# Patient Record
Sex: MALE | Race: WHITE | NOT HISPANIC OR LATINO | URBAN - METROPOLITAN AREA
[De-identification: names, ages, dates, MRNs, and addresses within clinical notes are randomized per-mention and may not be internally consistent; named-entity substitution may affect disease eponyms.]

---

## 2019-11-09 ENCOUNTER — EMERGENCY (EMERGENCY)
Facility: HOSPITAL | Age: 69
LOS: 1 days | Discharge: HOME | End: 2019-11-09
Attending: EMERGENCY MEDICINE
Payer: MEDICARE

## 2019-11-09 VITALS
TEMPERATURE: 98 F | HEART RATE: 86 BPM | OXYGEN SATURATION: 99 % | RESPIRATION RATE: 20 BRPM | SYSTOLIC BLOOD PRESSURE: 134 MMHG | DIASTOLIC BLOOD PRESSURE: 79 MMHG

## 2019-11-09 VITALS — WEIGHT: 164.91 LBS | HEIGHT: 69 IN

## 2019-11-09 PROCEDURE — 99283 EMERGENCY DEPT VISIT LOW MDM: CPT

## 2019-11-09 RX ORDER — IBUPROFEN 200 MG
600 TABLET ORAL ONCE
Refills: 0 | Status: COMPLETED | OUTPATIENT
Start: 2019-11-09 | End: 2019-11-09

## 2019-11-09 RX ORDER — AZTREONAM 2 G
1 VIAL (EA) INJECTION
Qty: 14 | Refills: 0
Start: 2019-11-09 | End: 2019-11-15

## 2019-11-09 RX ORDER — CEPHALEXIN 500 MG
1 CAPSULE ORAL
Qty: 28 | Refills: 0
Start: 2019-11-09 | End: 2019-11-15

## 2019-11-09 RX ORDER — CEPHALEXIN 500 MG
500 CAPSULE ORAL ONCE
Refills: 0 | Status: COMPLETED | OUTPATIENT
Start: 2019-11-09 | End: 2019-11-09

## 2019-11-09 RX ADMIN — Medication 600 MILLIGRAM(S): at 11:29

## 2019-11-09 RX ADMIN — Medication 600 MILLIGRAM(S): at 11:40

## 2019-11-09 RX ADMIN — Medication 500 MILLIGRAM(S): at 11:40

## 2019-11-09 RX ADMIN — Medication 1 TABLET(S): at 11:40

## 2019-11-09 NOTE — ED STATDOCS - PATIENT PORTAL LINK FT
You can access the FollowMyHealth Patient Portal offered by [facility] by registering at the following website: [email or web address]. By joining [employer]’s FollowMyHealth portal, you will also be able to view your health information using other applications (apps) compatible with our system.

## 2019-11-09 NOTE — ED ADULT TRIAGE NOTE - CHIEF COMPLAINT QUOTE
sent from urgent care for facial pain, redness and swelling, was told by urgent care either shingles or cellulitis

## 2019-11-09 NOTE — ED STATDOCS - OBJECTIVE STATEMENT
68 y/o M pt with hx of chronic neck pain presents to ED with wife at bedside c/o gradual onset progressive left sided facial swelling ( left cheek/ upper left lip), pain and erythema x 3-4 days. Patient Also c/o diffuse scalp pain and throat pain. No difficulty swallowing or breathing. Patient had dental abscesses in the past; has dentures in place to upper and lower mouth. Tolerating PO. No eye pain, fever, chills, nausea, vomiting, ABD pain diarrhea, dizziness, CP or SOB. Does not have PMD. No further complaints at this time. 70 y/o M pt with hx of chronic neck pain presents to ED with wife at bedside c/o gradual onset progressive left sided facial swelling ( left cheek/ upper left lip), pain and erythema x 3-4 days. Patient Also c/o diffuse scalp pain and intermittnet sore throat. No difficulty swallowing or breathing. Patient had dental abscesses in the past; has dentures in place to upper and lower mouth. Tolerating PO. No eye pain, fever, chills, nausea, vomiting, ABD pain diarrhea, dizziness, CP or SOB. Does not have PMD. No further complaints at this time.

## 2019-11-09 NOTE — ED STATDOCS - ENMT, MLM
Mouth with normal mucosa, no gingival swelling, no pharyngeal erythema, uvula is midline, normal TMs + warmth, erythema to left cheek that is indurated going around lip line, no fluctuance Mouth with normal mucosa, no gingival swelling, no pharyngeal erythema, uvula is midline, normal TMs + warmth, erythema to left cheek that is indurated going around lip line, no fluctuance; no eye involvement

## 2019-11-11 ENCOUNTER — INPATIENT (INPATIENT)
Facility: HOSPITAL | Age: 69
LOS: 2 days | Discharge: HOME | DRG: 596 | End: 2019-11-14
Attending: HOSPITALIST | Admitting: HOSPITALIST
Payer: MEDICARE

## 2019-11-11 VITALS
HEART RATE: 94 BPM | OXYGEN SATURATION: 96 % | TEMPERATURE: 99 F | WEIGHT: 164.91 LBS | SYSTOLIC BLOOD PRESSURE: 122 MMHG | HEIGHT: 69 IN | RESPIRATION RATE: 18 BRPM | DIASTOLIC BLOOD PRESSURE: 77 MMHG

## 2019-11-11 DIAGNOSIS — Z98.890 OTHER SPECIFIED POSTPROCEDURAL STATES: Chronic | ICD-10-CM

## 2019-11-11 LAB
ALBUMIN SERPL ELPH-MCNC: 5.1 G/DL — SIGNIFICANT CHANGE UP (ref 3.3–5.2)
ALP SERPL-CCNC: 80 U/L — SIGNIFICANT CHANGE UP (ref 40–120)
ALT FLD-CCNC: 16 U/L — SIGNIFICANT CHANGE UP
ANION GAP SERPL CALC-SCNC: 16 MMOL/L — SIGNIFICANT CHANGE UP (ref 5–17)
APTT BLD: 30.4 SEC — SIGNIFICANT CHANGE UP (ref 27.5–36.3)
AST SERPL-CCNC: 21 U/L — SIGNIFICANT CHANGE UP
BASOPHILS # BLD AUTO: 0.06 K/UL — SIGNIFICANT CHANGE UP (ref 0–0.2)
BASOPHILS NFR BLD AUTO: 1.1 % — SIGNIFICANT CHANGE UP (ref 0–2)
BILIRUB SERPL-MCNC: 0.4 MG/DL — SIGNIFICANT CHANGE UP (ref 0.4–2)
BUN SERPL-MCNC: 10 MG/DL — SIGNIFICANT CHANGE UP (ref 8–20)
CALCIUM SERPL-MCNC: 9.5 MG/DL — SIGNIFICANT CHANGE UP (ref 8.6–10.2)
CHLORIDE SERPL-SCNC: 96 MMOL/L — LOW (ref 98–107)
CO2 SERPL-SCNC: 22 MMOL/L — SIGNIFICANT CHANGE UP (ref 22–29)
CREAT SERPL-MCNC: 0.99 MG/DL — SIGNIFICANT CHANGE UP (ref 0.5–1.3)
EOSINOPHIL # BLD AUTO: 0.11 K/UL — SIGNIFICANT CHANGE UP (ref 0–0.5)
EOSINOPHIL NFR BLD AUTO: 2 % — SIGNIFICANT CHANGE UP (ref 0–6)
ERYTHROCYTE [SEDIMENTATION RATE] IN BLOOD: 2 MM/HR — SIGNIFICANT CHANGE UP (ref 0–20)
GLUCOSE SERPL-MCNC: 91 MG/DL — SIGNIFICANT CHANGE UP (ref 70–115)
HBA1C BLD-MCNC: 5.4 % — SIGNIFICANT CHANGE UP (ref 4–5.6)
HCT VFR BLD CALC: 50.5 % — HIGH (ref 39–50)
HGB BLD-MCNC: 17.7 G/DL — HIGH (ref 13–17)
IMM GRANULOCYTES NFR BLD AUTO: 0.2 % — SIGNIFICANT CHANGE UP (ref 0–1.5)
INR BLD: 0.93 RATIO — SIGNIFICANT CHANGE UP (ref 0.88–1.16)
LYMPHOCYTES # BLD AUTO: 0.67 K/UL — LOW (ref 1–3.3)
LYMPHOCYTES # BLD AUTO: 12 % — LOW (ref 13–44)
MCHC RBC-ENTMCNC: 33.7 PG — SIGNIFICANT CHANGE UP (ref 27–34)
MCHC RBC-ENTMCNC: 35 GM/DL — SIGNIFICANT CHANGE UP (ref 32–36)
MCV RBC AUTO: 96.2 FL — SIGNIFICANT CHANGE UP (ref 80–100)
MONOCYTES # BLD AUTO: 0.81 K/UL — SIGNIFICANT CHANGE UP (ref 0–0.9)
MONOCYTES NFR BLD AUTO: 14.5 % — HIGH (ref 2–14)
NEUTROPHILS # BLD AUTO: 3.94 K/UL — SIGNIFICANT CHANGE UP (ref 1.8–7.4)
NEUTROPHILS NFR BLD AUTO: 70.2 % — SIGNIFICANT CHANGE UP (ref 43–77)
PLATELET # BLD AUTO: 166 K/UL — SIGNIFICANT CHANGE UP (ref 150–400)
POTASSIUM SERPL-MCNC: 4.5 MMOL/L — SIGNIFICANT CHANGE UP (ref 3.5–5.3)
POTASSIUM SERPL-SCNC: 4.5 MMOL/L — SIGNIFICANT CHANGE UP (ref 3.5–5.3)
PROT SERPL-MCNC: 7.9 G/DL — SIGNIFICANT CHANGE UP (ref 6.6–8.7)
PROTHROM AB SERPL-ACNC: 10.7 SEC — SIGNIFICANT CHANGE UP (ref 10–12.9)
RBC # BLD: 5.25 M/UL — SIGNIFICANT CHANGE UP (ref 4.2–5.8)
RBC # FLD: 12.8 % — SIGNIFICANT CHANGE UP (ref 10.3–14.5)
SODIUM SERPL-SCNC: 134 MMOL/L — LOW (ref 135–145)
WBC # BLD: 5.6 K/UL — SIGNIFICANT CHANGE UP (ref 3.8–10.5)
WBC # FLD AUTO: 5.6 K/UL — SIGNIFICANT CHANGE UP (ref 3.8–10.5)

## 2019-11-11 PROCEDURE — 70487 CT MAXILLOFACIAL W/DYE: CPT | Mod: 26

## 2019-11-11 PROCEDURE — 99218: CPT

## 2019-11-11 RX ORDER — LANOLIN ALCOHOL/MO/W.PET/CERES
5 CREAM (GRAM) TOPICAL AT BEDTIME
Refills: 0 | Status: DISCONTINUED | OUTPATIENT
Start: 2019-11-11 | End: 2019-11-14

## 2019-11-11 RX ORDER — KETOROLAC TROMETHAMINE 30 MG/ML
15 SYRINGE (ML) INJECTION EVERY 8 HOURS
Refills: 0 | Status: DISCONTINUED | OUTPATIENT
Start: 2019-11-11 | End: 2019-11-14

## 2019-11-11 RX ORDER — PIPERACILLIN AND TAZOBACTAM 4; .5 G/20ML; G/20ML
3.38 INJECTION, POWDER, LYOPHILIZED, FOR SOLUTION INTRAVENOUS EVERY 8 HOURS
Refills: 0 | Status: DISCONTINUED | OUTPATIENT
Start: 2019-11-11 | End: 2019-11-11

## 2019-11-11 RX ORDER — ACETAMINOPHEN 500 MG
975 TABLET ORAL EVERY 6 HOURS
Refills: 0 | Status: DISCONTINUED | OUTPATIENT
Start: 2019-11-11 | End: 2019-11-14

## 2019-11-11 RX ORDER — PIPERACILLIN AND TAZOBACTAM 4; .5 G/20ML; G/20ML
3.38 INJECTION, POWDER, LYOPHILIZED, FOR SOLUTION INTRAVENOUS EVERY 6 HOURS
Refills: 0 | Status: DISCONTINUED | OUTPATIENT
Start: 2019-11-11 | End: 2019-11-11

## 2019-11-11 RX ORDER — DIAZEPAM 5 MG
2 TABLET ORAL ONCE
Refills: 0 | Status: DISCONTINUED | OUTPATIENT
Start: 2019-11-11 | End: 2019-11-11

## 2019-11-11 RX ORDER — PIPERACILLIN AND TAZOBACTAM 4; .5 G/20ML; G/20ML
3.38 INJECTION, POWDER, LYOPHILIZED, FOR SOLUTION INTRAVENOUS ONCE
Refills: 0 | Status: COMPLETED | OUTPATIENT
Start: 2019-11-11 | End: 2019-11-11

## 2019-11-11 RX ADMIN — Medication 600 MILLIGRAM(S): at 16:35

## 2019-11-11 RX ADMIN — PIPERACILLIN AND TAZOBACTAM 3.38 GRAM(S): 4; .5 INJECTION, POWDER, LYOPHILIZED, FOR SOLUTION INTRAVENOUS at 14:24

## 2019-11-11 RX ADMIN — PIPERACILLIN AND TAZOBACTAM 200 GRAM(S): 4; .5 INJECTION, POWDER, LYOPHILIZED, FOR SOLUTION INTRAVENOUS at 10:43

## 2019-11-11 RX ADMIN — Medication 100 MILLIGRAM(S): at 15:26

## 2019-11-11 RX ADMIN — Medication 2 MILLIGRAM(S): at 23:01

## 2019-11-11 RX ADMIN — Medication 15 MILLIGRAM(S): at 16:35

## 2019-11-11 RX ADMIN — Medication 600 MILLIGRAM(S): at 23:32

## 2019-11-11 RX ADMIN — Medication 15 MILLIGRAM(S): at 17:38

## 2019-11-11 RX ADMIN — Medication 100 MILLIGRAM(S): at 23:01

## 2019-11-11 NOTE — ED ADULT NURSE REASSESSMENT NOTE - NEURO WDL
LM letting mom know to call back to sched RN visit for 4 mo shots. Alert and oriented to person, place and time, memory intact, behavior appropriate to situation, PERRL.

## 2019-11-11 NOTE — ED STATDOCS - SKIN, MLM
STS and well demarcated erythematous patch to left face from forehead to cheek with left infraorbital STS, small abrasion overlying left zygoma.

## 2019-11-11 NOTE — ED CDU PROVIDER INITIAL DAY NOTE - CARDIAC, MLM
Normal rate, regular rhythm.  Heart sounds S1, S2. Normal rate, regular rhythm.  Heart sounds S1, S2. Peripheral pulse 2+ b/l

## 2019-11-11 NOTE — ED CDU PROVIDER INITIAL DAY NOTE - OBJECTIVE STATEMENT
70y/o M presents to the ED c/o worsening left sided facial swelling, localized under eye with sx of intermittent shooting pain localized behind left ear and fever, onset 1 week ago. Pt reports that he presented to the ED 2 days ago, was prescribed medication which he has been compliant with taking and was referred to follow up at ED if sx worsened. Denies congestion, SOB, cough. No additional complaints at this time.   Denies congestion.  no recent dental or ent work   no trauma to the face

## 2019-11-11 NOTE — ED CDU PROVIDER INITIAL DAY NOTE - PROGRESS NOTE DETAILS
Pt evaluated at bedside. Reporting left facial cellulitis, recently d/c'd on Keflex/bactrim but back due to getting worse. No PMHx but has not seen PMD in long time. HPI/PE/ROS as noted above. Will continue IV clindamycin. PT signed out to SHANNON hastings after lengthy discussion about case with SHANNON FERRELL.   Pt seen resting comfortable in no acute distress in bed, no acute complaints will follow CDU initial intake orders observe for change in pt condition, results and or consults.

## 2019-11-11 NOTE — ED CDU PROVIDER INITIAL DAY NOTE - PHYSICAL EXAMINATION
face : left redness from inferior orbit extending inferiorly to the left  lip. warm to touch and blanchable Face : left redness from inferior orbit extending inferiorly to the left  lip. warm to touch and blanchable

## 2019-11-11 NOTE — ED ADULT TRIAGE NOTE - CHIEF COMPLAINT QUOTE
Was in ED saturday, given medication for infection to left side of face, worsening redness and swelling

## 2019-11-11 NOTE — ED STATDOCS - OBJECTIVE STATEMENT
68y/o M presents to the ED c/o worsening left sided facial swelling, localized under eye with sx of intermittent shooting pain localized behind left ear and fever, onset 1 week ago. Pt reports that he presented to the ED 2 days ago, was prescribed medication which he has been compliant with taking and was referred to follow up at ED if sx worsened. Denies congestion, SOB, cough. No additional complaints at this time.   Denies congestion. 70y/o M presents to the ED c/o worsening left sided facial swelling and soreness with sx of intermittent shooting pain localized behind left ear and fever, onset 1 week ago. Pt reports that he presented to the ED 2 days ago, was prescribed medication which he has been compliant with taking and was referred to follow up at ED if sx worsened. Denies congestion, SOB, cough. Denies dental pain or sinus problemsNo additional complaints at this time.   Denies congestion.

## 2019-11-11 NOTE — ED CDU PROVIDER INITIAL DAY NOTE - ATTENDING CONTRIBUTION TO CARE
I agree with the PA's note and was available for any issues/concerns. I was directly involved in patient care. My brief overall assessment is as follows: pt with left facial celluitis, no deeper infection/abscess on ct. recently on keflex/bactrim. will give iv clinda. otherwise well and no airway compromise/complaints.

## 2019-11-11 NOTE — ED STATDOCS - NS_ ATTENDINGSCRIBEDETAILS _ED_A_ED_FT
I, Kurtis Mina, performed the initial face to face bedside interview with this patient regarding history of present illness, review of symptoms and relevant past medical, social and family history.  I completed an independent physical examination.  I was the provider who initially evaluated this patient.  The history, relevant review of systems, past medical and surgical history, medical decision making, and physical examination was documented by the scribe in my presence and I attest to the accuracy of the documentation. Follow-up on ordered tests (ie labs, radiologic studies) and re-evaluation of the patient's status has been communicated to the ACP.  Disposition of the patient will be based on test outcome and response to ED interventions.

## 2019-11-11 NOTE — ED STATDOCS - CONSTITUTIONAL, MLM
normal... well appearing, well nourished, and in no apparent distress. nontoxic appearing and in no apparent distress.

## 2019-11-11 NOTE — ED STATDOCS - PROGRESS NOTE DETAILS
SHANNON RAMIREZ: PT evaluated by intake physician. HPI/PE/ROS as noted above. Will follow up plan per intake physician  well demarcated red rash to the left face with failed outpt antibiotics  denies trauma to face fever or chills   pending labs ct

## 2019-11-12 DIAGNOSIS — L03.211 CELLULITIS OF FACE: ICD-10-CM

## 2019-11-12 DIAGNOSIS — F17.200 NICOTINE DEPENDENCE, UNSPECIFIED, UNCOMPLICATED: ICD-10-CM

## 2019-11-12 LAB
APPEARANCE UR: CLEAR — SIGNIFICANT CHANGE UP
BACTERIA # UR AUTO: NEGATIVE — SIGNIFICANT CHANGE UP
BILIRUB UR-MCNC: NEGATIVE — SIGNIFICANT CHANGE UP
COLOR SPEC: YELLOW — SIGNIFICANT CHANGE UP
CULTURE RESULTS: NO GROWTH — SIGNIFICANT CHANGE UP
DIFF PNL FLD: ABNORMAL
EPI CELLS # UR: NEGATIVE — SIGNIFICANT CHANGE UP
GLUCOSE UR QL: NEGATIVE MG/DL — SIGNIFICANT CHANGE UP
HCT VFR BLD CALC: 50.3 % — HIGH (ref 39–50)
HGB BLD-MCNC: 17 G/DL — SIGNIFICANT CHANGE UP (ref 13–17)
KETONES UR-MCNC: NEGATIVE — SIGNIFICANT CHANGE UP
LACTATE BLDV-MCNC: 1.1 MMOL/L — SIGNIFICANT CHANGE UP (ref 0.5–2)
LEUKOCYTE ESTERASE UR-ACNC: NEGATIVE — SIGNIFICANT CHANGE UP
MCHC RBC-ENTMCNC: 33.1 PG — SIGNIFICANT CHANGE UP (ref 27–34)
MCHC RBC-ENTMCNC: 33.8 GM/DL — SIGNIFICANT CHANGE UP (ref 32–36)
MCV RBC AUTO: 97.9 FL — SIGNIFICANT CHANGE UP (ref 80–100)
NITRITE UR-MCNC: NEGATIVE — SIGNIFICANT CHANGE UP
PH UR: 6 — SIGNIFICANT CHANGE UP (ref 5–8)
PLATELET # BLD AUTO: 149 K/UL — LOW (ref 150–400)
PROT UR-MCNC: NEGATIVE MG/DL — SIGNIFICANT CHANGE UP
RBC # BLD: 5.14 M/UL — SIGNIFICANT CHANGE UP (ref 4.2–5.8)
RBC # FLD: 12.7 % — SIGNIFICANT CHANGE UP (ref 10.3–14.5)
RBC CASTS # UR COMP ASSIST: SIGNIFICANT CHANGE UP /HPF (ref 0–4)
SP GR SPEC: 1.01 — SIGNIFICANT CHANGE UP (ref 1.01–1.02)
SPECIMEN SOURCE: SIGNIFICANT CHANGE UP
UROBILINOGEN FLD QL: NEGATIVE MG/DL — SIGNIFICANT CHANGE UP
WBC # BLD: 4.75 K/UL — SIGNIFICANT CHANGE UP (ref 3.8–10.5)
WBC # FLD AUTO: 4.75 K/UL — SIGNIFICANT CHANGE UP (ref 3.8–10.5)
WBC UR QL: SIGNIFICANT CHANGE UP

## 2019-11-12 PROCEDURE — 99222 1ST HOSP IP/OBS MODERATE 55: CPT

## 2019-11-12 PROCEDURE — 99217: CPT

## 2019-11-12 RX ORDER — FLUCONAZOLE 150 MG/1
150 TABLET ORAL ONCE
Refills: 0 | Status: DISCONTINUED | OUTPATIENT
Start: 2019-11-12 | End: 2019-11-12

## 2019-11-12 RX ORDER — VALACYCLOVIR 500 MG/1
1000 TABLET, FILM COATED ORAL EVERY 8 HOURS
Refills: 0 | Status: DISCONTINUED | OUTPATIENT
Start: 2019-11-12 | End: 2019-11-14

## 2019-11-12 RX ORDER — ENOXAPARIN SODIUM 100 MG/ML
40 INJECTION SUBCUTANEOUS DAILY
Refills: 0 | Status: DISCONTINUED | OUTPATIENT
Start: 2019-11-12 | End: 2019-11-14

## 2019-11-12 RX ORDER — DIPHENHYDRAMINE HCL 50 MG
25 CAPSULE ORAL EVERY 4 HOURS
Refills: 0 | Status: DISCONTINUED | OUTPATIENT
Start: 2019-11-12 | End: 2019-11-14

## 2019-11-12 RX ORDER — INFLUENZA VIRUS VACCINE 15; 15; 15; 15 UG/.5ML; UG/.5ML; UG/.5ML; UG/.5ML
0.5 SUSPENSION INTRAMUSCULAR ONCE
Refills: 0 | Status: DISCONTINUED | OUTPATIENT
Start: 2019-11-12 | End: 2019-11-14

## 2019-11-12 RX ADMIN — Medication 5 MILLIGRAM(S): at 21:40

## 2019-11-12 RX ADMIN — Medication 15 MILLIGRAM(S): at 10:24

## 2019-11-12 RX ADMIN — Medication 100 MILLIGRAM(S): at 06:58

## 2019-11-12 RX ADMIN — Medication 125 MILLIGRAM(S): at 10:23

## 2019-11-12 RX ADMIN — Medication 600 MILLIGRAM(S): at 08:24

## 2019-11-12 RX ADMIN — VALACYCLOVIR 1000 MILLIGRAM(S): 500 TABLET, FILM COATED ORAL at 12:35

## 2019-11-12 RX ADMIN — Medication 975 MILLIGRAM(S): at 06:48

## 2019-11-12 RX ADMIN — VALACYCLOVIR 1000 MILLIGRAM(S): 500 TABLET, FILM COATED ORAL at 21:40

## 2019-11-12 RX ADMIN — ENOXAPARIN SODIUM 40 MILLIGRAM(S): 100 INJECTION SUBCUTANEOUS at 12:35

## 2019-11-12 RX ADMIN — Medication 15 MILLIGRAM(S): at 19:56

## 2019-11-12 RX ADMIN — Medication 975 MILLIGRAM(S): at 05:51

## 2019-11-12 RX ADMIN — Medication 25 MILLIGRAM(S): at 10:24

## 2019-11-12 NOTE — ED CDU PROVIDER SUBSEQUENT DAY NOTE - PHYSICAL EXAMINATION
Face : left redness from inferior orbit extending inferiorly to the left  lip. warm to touch and blanchable

## 2019-11-12 NOTE — H&P ADULT - PROBLEM SELECTOR PLAN 1
WBC is normal with no fever. possible Shingles as the erythema takes only the left side of the face with sharp end of the erythema and swelling by the midline of the lower lips. tiny blisters also started to appear although no palpable or tender lymph nodes. there is a new black spot/skin gangrene started to show under the left eye ?? spider bite??.  patient was started on IV Clindamycin 600 mg ever 8 hrs, Valcyclovir 1000 mg po every 8 hrs. ID consult: Dr Melvin, is pending

## 2019-11-12 NOTE — H&P ADULT - ASSESSMENT
68 y/o male smoker with inflammation of the left side of the face with possible cellulitis/Shingles/insect bite

## 2019-11-12 NOTE — ED CDU PROVIDER SUBSEQUENT DAY NOTE - ATTENDING CONTRIBUTION TO CARE
Lorena BRIONES- 68 Y/O M with left periorbital cellulitis which is progressive and becoming more swollen and spreading placed in cdu for iv antibiotics. Swelling overnight has not gone down and rather increasing. No changes in vision but has shooting pain radiating behind left ear to face. 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 The infection appears odontogenic in origin and ct max face showed no abscess that can be drained    pt is alert, well appearing male, s1s2 normal reg, b/l clear breath sounds, abd soft, nt, nd, neuro exam aox3, cn 2-12 intact, no focal deficits, peerl eomi, left facial periorbital swelling and redness, redness extends down to first canine on left and now streaking extends to forehead, On top of redness, noted small vesicles and also noted some satellite lesions    plan to add valtrex, fluconazole and benadryl, solumedrol along with clinda to resolved swelling and redness and admit

## 2019-11-12 NOTE — ED CDU PROVIDER DISPOSITION NOTE - CLINICAL COURSE
70y/o M presents to the ED c/o worsening left sided facial swelling, localized under eye with sx of intermittent shooting pain localized behind left ear and fever, onset 1 week ago. Pt reports that he presented to the ED 3 days ago, was prescribed keflex and bactrim which he has been compliant with taking and was referred to follow up at ED if sx worsened. Denies trauma to the face.  Has been on IV clindamycin x 3 doses.  Wife and patient report no improvement in redness or swelling of left facial cellulitis.  CT without abscess.  Dr Carrillo felt that patient needed valtrex added along with diflucan, solumedrol and benadryl.  Report and care to Dr New, patient admitted for continued abx.

## 2019-11-12 NOTE — H&P ADULT - NEGATIVE OPHTHALMOLOGIC SYMPTOMS
no irritation L/no blurred vision R/no pain L/no pain R/no irritation R/no diplopia/no photophobia/no discharge R/no discharge L/no blurred vision L

## 2019-11-12 NOTE — H&P ADULT - ENMT COMMENTS
left side dusky red swollen skin from the left of the left eye brow to the lower edge of the upper lip with sharp ending of the erythema and swelling at the midline of the upper lip few sessile ting blisters appearing in the left maxillary area. NO palpable left pre auricular, post auricular, submandibular, occipital, or anterior cervical lymph nodes-

## 2019-11-12 NOTE — ED CDU PROVIDER SUBSEQUENT DAY NOTE - HISTORY
PT with cellulitis of the Face with neg CT for collection no visible drainable collection able to be located on PE pt improving swelling redness and pain awaiting further doses of IV abx and revaluation.

## 2019-11-12 NOTE — CONSULT NOTE ADULT - SUBJECTIVE AND OBJECTIVE BOX
[facility]  INFECTIOUS DISEASES AND INTERNAL MEDICINE at [place]  =======================================================  Zain Reina MD  Diplomates American Board of Internal Medicine and Infectious Diseases  =======================================================    [id number]  JANELL KOWALSKI     CC:  left face swelling     HPI:  70 y/o man with no sig significant PMH, noticed swelling in left lower eyelid that started spreading on  came to [facility] ED on  and received bactrim and keflex with no changes, so decided to come in today again. He had scalp sensitivity and shooting pain behind his left ear that still continues. Vision is ok but has erythema in L eye. Had varicella as a kid, never had zoster in the past. Didn't have zoster vaccine in the past. Today had a low grade fever and swelling and pain in left side of face. There are multiple small areas that look like vesicles.     PAST MEDICAL & SURGICAL HISTORY:  No pertinent past medical history  H/O rotator cuff surgery    Social Hx: smoker, no ETOH or drugs     FAMILY HISTORY:  No pertinent family history in first degree relatives    Allergies  No Known Allergies    Antibiotics:  clindamycin IVPB 600 milliGRAM(s) IV Intermittent every 8 hours  valACYclovir 1000 milliGRAM(s) Oral every 8 hours    REVIEW OF SYSTEMS:  CONSTITUTIONAL:  No Fever or chills  HEENT:   No sore throat or runny nose. pain and swelling in left face  CARDIOVASCULAR:  No chest pain or SOB.  RESPIRATORY:  No cough, shortness of breath, PND or orthopnea.  GASTROINTESTINAL:  No nausea, vomiting or diarrhea.  GENITOURINARY:  No dysuria, frequency or urgency. No Blood in urine  MUSCULOSKELETAL:  no joint aches, no muscle pain  SKIN:  left face swelling and pain  NEUROLOGIC:  No paresthesias, fasciculations, seizures or weakness.  PSYCHIATRIC:  No disorder of thought or mood.  ENDOCRINE:  No heat or cold intolerance, polyuria or polydipsia.  HEMATOLOGICAL:  No easy bruising or bleeding.     Physical Exam:  Vital Signs Last 24 Hrs  T(C): 37 (2019 07:56), Max: 37.2 (2019 14:56)  T(F): 98.6 (2019 07:56), Max: 99 (2019 14:56)  HR: 81 (2019 11:06) (71 - 86)  BP: 106/68 (2019 11:06) (106/68 - 135/87  RR: 18 (2019 07:56) (18 - 20)  SpO2: 98% (2019 11:06) (95% - 98%)  GEN: NAD  HEENT: normocephalic and atraumatic. left eye injection, left upper face with erythema and swelling tender in touch especially upper lip.   NECK: Supple.  No lymphadenopathy   LUNGS: Clear to auscultation.  HEART: Regular rate and rhythm without murmur.  ABDOMEN: Soft, nontender, and nondistended.  Positive bowel sounds.    : No CVA tenderness  EXTREMITIES: Without any cyanosis, clubbing, rash, lesions or edema.  NEUROLOGIC: grossly intact.  PSYCHIATRIC: Appropriate affect .  SKIN: small areas of open vesicles in left side of upper face.     Labs:      134<L>  |  96<L>  |  10.0  ----------------------------<  91  4.5   |  22.0  |  0.99    Ca    9.5      2019 10:51    TPro  7.9  /  Alb  5.1  /  TBili  0.4  /  DBili  x   /  AST  21  /  ALT  16  /  AlkPhos  80                          17.0   4.75  )-----------( 149      ( 2019 06:06 )             50.3     PT/INR - ( 2019 10:51 )   PT: 10.7 sec;   INR: 0.93 ratio    PTT - ( 2019 10:51 )  PTT:30.4 sec  Urinalysis Basic - ( 2019 10:27 )    Color: Yellow / Appearance: Clear / S.015 / pH: x  Gluc: x / Ketone: Negative  / Bili: Negative / Urobili: Negative mg/dL   Blood: x / Protein: Negative mg/dL / Nitrite: Negative   Leuk Esterase: Negative / RBC: 0-2 /HPF / WBC 0-2   Sq Epi: x / Non Sq Epi: Negative / Bacteria: Negative    LIVER FUNCTIONS - ( 2019 10:51 )  Alb: 5.1 g/dL / Pro: 7.9 g/dL / ALK PHOS: 80 U/L / ALT: 16 U/L / AST: 21 U/L / GGT: x           RECENT CULTURES:  None    All imaging and other data have been reviewed.      Assessment and Plan:   70 y/o man with no sig significant PMH, noticed swelling in left lower eyelid that started spreading on  came to [facility] ED on  and received bactrim and keflex with no changes, so decided to come in today again. Clinically especially the distribution of rash looks like zoster.     Left ophthalmic zoster     - Blood culture done pending  - Will try to send lesion viral PCR even though no vesicle currently (sent from left nasolabial fold open leison)  - Agree with Antiviral, can continue valtrex 1g daily  - If worsening of his condition with vision change, will switch to IV acyclovir (10mg/kg TID)  - Vision check since can get episcleritis and keratitis, if any sign will call ophthalmology   - Topical streoid eye drop  - Stop clindamycin   - Trend WBC and Tm, currently both normal.     Will follow.    Case discussed with Virgen. [facility]  INFECTIOUS DISEASES AND INTERNAL MEDICINE at [place]  =======================================================  Zain Reina MD  Diplomates American Board of Internal Medicine and Infectious Diseases  =======================================================    [id number]  JANELL KOWALSKI     CC:  left face swelling     HPI:  68 y/o man with no sig significant PMH, noticed swelling in left lower eyelid that started spreading on  came to [facility] ED on  and received bactrim and keflex with no changes, so decided to come in today again. He had scalp sensitivity and shooting pain behind his left ear that still continues. Vision is ok but has erythema in L eye. Had varicella as a kid, never had zoster in the past. Didn't have zoster vaccine in the past. Today had a low grade fever and swelling and pain in left side of face. There are multiple small areas that look like vesicles.     PAST MEDICAL & SURGICAL HISTORY:  No pertinent past medical history  H/O rotator cuff surgery    Social Hx: smoker, no ETOH or drugs     FAMILY HISTORY:  No pertinent family history in first degree relatives    Allergies  No Known Allergies    Antibiotics:  clindamycin IVPB 600 milliGRAM(s) IV Intermittent every 8 hours  valACYclovir 1000 milliGRAM(s) Oral every 8 hours    REVIEW OF SYSTEMS:  CONSTITUTIONAL:  No Fever or chills  HEENT:   No sore throat or runny nose. pain and swelling in left face  CARDIOVASCULAR:  No chest pain or SOB.  RESPIRATORY:  No cough, shortness of breath, PND or orthopnea.  GASTROINTESTINAL:  No nausea, vomiting or diarrhea.  GENITOURINARY:  No dysuria, frequency or urgency. No Blood in urine  MUSCULOSKELETAL:  no joint aches, no muscle pain  SKIN:  left face swelling and pain  NEUROLOGIC:  No paresthesias, fasciculations, seizures or weakness.  PSYCHIATRIC:  No disorder of thought or mood.  ENDOCRINE:  No heat or cold intolerance, polyuria or polydipsia.  HEMATOLOGICAL:  No easy bruising or bleeding.     Physical Exam:  Vital Signs Last 24 Hrs  T(C): 37 (2019 07:56), Max: 37.2 (2019 14:56)  T(F): 98.6 (2019 07:56), Max: 99 (2019 14:56)  HR: 81 (2019 11:06) (71 - 86)  BP: 106/68 (2019 11:06) (106/68 - 135/87  RR: 18 (2019 07:56) (18 - 20)  SpO2: 98% (2019 11:06) (95% - 98%)  GEN: NAD  HEENT: normocephalic and atraumatic. left eye injection, left upper face with erythema and swelling tender in touch especially upper lip.   NECK: Supple.  No lymphadenopathy   LUNGS: Clear to auscultation.  HEART: Regular rate and rhythm without murmur.  ABDOMEN: Soft, nontender, and nondistended.  Positive bowel sounds.    : No CVA tenderness  EXTREMITIES: Without any cyanosis, clubbing, rash, lesions or edema.  NEUROLOGIC: grossly intact.  PSYCHIATRIC: Appropriate affect .  SKIN: small areas of open vesicles in left side of upper face.     Labs:      134<L>  |  96<L>  |  10.0  ----------------------------<  91  4.5   |  22.0  |  0.99    Ca    9.5      2019 10:51    TPro  7.9  /  Alb  5.1  /  TBili  0.4  /  DBili  x   /  AST  21  /  ALT  16  /  AlkPhos  80                          17.0   4.75  )-----------( 149      ( 2019 06:06 )             50.3     PT/INR - ( 2019 10:51 )   PT: 10.7 sec;   INR: 0.93 ratio    PTT - ( 2019 10:51 )  PTT:30.4 sec  Urinalysis Basic - ( 2019 10:27 )    Color: Yellow / Appearance: Clear / S.015 / pH: x  Gluc: x / Ketone: Negative  / Bili: Negative / Urobili: Negative mg/dL   Blood: x / Protein: Negative mg/dL / Nitrite: Negative   Leuk Esterase: Negative / RBC: 0-2 /HPF / WBC 0-2   Sq Epi: x / Non Sq Epi: Negative / Bacteria: Negative    LIVER FUNCTIONS - ( 2019 10:51 )  Alb: 5.1 g/dL / Pro: 7.9 g/dL / ALK PHOS: 80 U/L / ALT: 16 U/L / AST: 21 U/L / GGT: x           RECENT CULTURES:  None    All imaging and other data have been reviewed.      Assessment and Plan:   68 y/o man with no sig significant PMH, noticed swelling in left lower eyelid that started spreading on  came to [facility] ED on  and received bactrim and keflex with no changes, so decided to come in today again. Clinically especially the distribution of rash looks like zoster.     Left maxillary (trigeminal) zoster     - Blood culture done pending  - Will try to send lesion viral PCR even though no vesicle currently (sent from left nasolabial fold open leison)  - Agree with Antiviral, can continue valtrex 1g q8h  - If worsening of his condition will switch to IV acyclovir (10mg/kg TID)  - Vision check if any changes, will call ophthalmology   - Stop clindamycin   - Trend WBC and Tm, currently both normal.     Will follow.    Case discussed with Virgen.

## 2019-11-12 NOTE — ED ADULT NURSE REASSESSMENT NOTE - NS ED NURSE REASSESS COMMENT FT1
Assumed care of pt at this time from AM HR RN. Pt aware he is to be admitted. AM Rn attempted to call for report, RN upstairs refused. Will attempt again. Pt states pain to left face. A/Ox4. Ambulatory and independently will  be admitted for ABX and anti-virals. Call bell within reach, pt watching tv at this time.
Care endorsed to Ruma bateman RN. Patient pending IV abx tonight. No s/s of distress. RN with no further questions.
Pt alert and oriented, no apparent distress noted at this time. Pt handed off to Chinedu MARINO in stable condition.
Report given to GRISELDA RNKassandra 15 2000. Logistics aware, setting up transport at this time. Pt aware of plan of care.
assumed care of pt @ 1930, report received from Marguerite MARINO, charting as noted. pt AOx4 in NAD, Vital Signs Stable. pt presents with redness and swelling to left side of face. pt states his left eye is "tearing up a lot". pt denies any pain at this time. small dry scab noted under left eye, no drainage noted at this time. HR is WNL, lung sounds are clear b/l, abd is soft and nontender with positive bowel sounds in all four quadrants. pt educated on plan of care and observation stay. Plan of care taught back to RN. Proficiency determined from successful pt teach back. Pt oriented to unit, staff, and room. Pt reeducated on call bell use. Bed locked in lowest position, call bell within reach. All questions and concerns addressed.     next dose of IV abx @ 2300. pt agreeable with plan of care.
pt c/o 4/10 pain at this time from left side of face. pt presents with slight increase of redness and swelling to left side of face. SHANNON Uribe reassessed pt. tylenol administered, next dose of IV abx @ 0700.
second dose clindamycin administered. valium administered upon pt request. all questions and concerns addressed. safety precautions in place: stretcher locked in lowest position, call bell in reach, side rail up x1.
Assumed care of the patient at 1445. Patient transferred to observation unit CDU 9. Patient A&Ox4, wife present at the bedside. Left side of face and left lower eyelid noted with swelling and erythema, patient states pain 4/10 to left side of face. SHANNON Cartwright notified, awaiting orders for toradol IV. VSS, afebrile. Patient admitted to observation unit for IV abx. Patient in understanding of plan of care. Patient with no further questions for the nurse. will continue to monitor.

## 2019-11-12 NOTE — ED CDU PROVIDER DISPOSITION NOTE - ATTENDING CONTRIBUTION TO CARE
Lorena BRIONES- 70 Y/O M with left periorbital cellulitis which is progressive and becoming more swollen and spreading placed in cdu for iv antibiotics. Swelling overnight has not gone down and rather increasing. No changes in vision but has shooting pain radiating behind left ear to face. 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 The infection appears odontogenic in origin and ct max face showed no abscess that can be drained    pt is alert, well appearing male, s1s2 normal reg, b/l clear breath sounds, abd soft, nt, nd, neuro exam aox3, cn 2-12 intact, no focal deficits, peerl eomi, left facial periorbital swelling and redness, redness extends down to first canine on left and now streaking extends to forehead, On top of redness, noted small vesicles and also noted some satellite lesions    plan to add valtrex, fluconazole and benadryl, solumedrol along with clinda to resolved swelling and redness and admit.

## 2019-11-12 NOTE — H&P ADULT - HISTORY OF PRESENT ILLNESS
68 y/o male smoker with no significant PMH, came to the Er because of progressing left side face swelling and erythema started on 11/6/19. no fever. in the ER, T max: 100.1 rectal. no denture or gum infection. no eye pain, discharge or visual disturbance. hyperesthesia in the scalp 2 days ago. a black spot started to appear under the left eye. no wounds or discharge 1 day ago, the erythema started to appear in the left fabrice.  WBC: 4 700. CT maxillofacial showed skin edema w/o retro orbital affection. o/e: ? tiny sessile blisters. no palpable or tender lymph nodes.  -

## 2019-11-13 LAB
HCV AB S/CO SERPL IA: 0.26 S/CO — SIGNIFICANT CHANGE UP (ref 0–0.99)
HCV AB SERPL-IMP: SIGNIFICANT CHANGE UP
HSV+VZV DNA SPEC QL NAA+PROBE: ABNORMAL
SPECIMEN SOURCE: SIGNIFICANT CHANGE UP

## 2019-11-13 PROCEDURE — 99232 SBSQ HOSP IP/OBS MODERATE 35: CPT

## 2019-11-13 PROCEDURE — 99233 SBSQ HOSP IP/OBS HIGH 50: CPT

## 2019-11-13 RX ADMIN — Medication 975 MILLIGRAM(S): at 14:15

## 2019-11-13 RX ADMIN — Medication 5 MILLIGRAM(S): at 21:20

## 2019-11-13 RX ADMIN — VALACYCLOVIR 1000 MILLIGRAM(S): 500 TABLET, FILM COATED ORAL at 13:17

## 2019-11-13 RX ADMIN — VALACYCLOVIR 1000 MILLIGRAM(S): 500 TABLET, FILM COATED ORAL at 05:00

## 2019-11-13 RX ADMIN — VALACYCLOVIR 1000 MILLIGRAM(S): 500 TABLET, FILM COATED ORAL at 21:20

## 2019-11-13 RX ADMIN — Medication 975 MILLIGRAM(S): at 13:15

## 2019-11-13 RX ADMIN — ENOXAPARIN SODIUM 40 MILLIGRAM(S): 100 INJECTION SUBCUTANEOUS at 13:16

## 2019-11-13 NOTE — PROGRESS NOTE ADULT - SUBJECTIVE AND OBJECTIVE BOX
[facility]  INFECTIOUS DISEASES AND INTERNAL MEDICINE at [place]  =======================================================  Zain Reina MD  Diplomates American Board of Internal Medicine and Infectious Diseases  =======================================================    [id number]  JANELL KOWALSKI     Follow up: L Maxillary branch zoster     Feels less pain in face, still has swelling and erythema. No response to keflex, clindamycin or bactrim.   Has been started on valtrex yesterday.   PCR is still pending. NO vesicular lesion at this time.     PAST MEDICAL & SURGICAL HISTORY:  No pertinent past medical history  H/O rotator cuff surgery    Social Hx: smoker, no ETOH or drugs     FAMILY HISTORY:  No pertinent family history in first degree relatives    Allergies  No Known Allergies    Antibiotics:  valACYclovir 1000 milliGRAM(s) Oral every 8 hours    REVIEW OF SYSTEMS:  CONSTITUTIONAL:  No Fever or chills  HEENT:   No sore throat or runny nose. pain and swelling in left face  CARDIOVASCULAR:  No chest pain or SOB.  RESPIRATORY:  No cough, shortness of breath, PND or orthopnea.  GASTROINTESTINAL:  No nausea, vomiting or diarrhea.  GENITOURINARY:  No dysuria, frequency or urgency. No Blood in urine  MUSCULOSKELETAL:  no joint aches, no muscle pain  SKIN:  left face swelling and pain  NEUROLOGIC:  No paresthesias, fasciculations, seizures or weakness.  PSYCHIATRIC:  No disorder of thought or mood.  ENDOCRINE:  No heat or cold intolerance, polyuria or polydipsia.  HEMATOLOGICAL:  No easy bruising or bleeding.     Physical Exam:  Vital Signs Last 24 Hrs  T(C): 37.2 (13 Nov 2019 08:02), Max: 37.2 (13 Nov 2019 08:02)  T(F): 99 (13 Nov 2019 08:02), Max: 99 (13 Nov 2019 08:02)  HR: 79 (13 Nov 2019 08:02) (75 - 98)  BP: 135/81 (13 Nov 2019 08:02) (112/65 - 152/89)  BP(mean): --  RR: 20 (13 Nov 2019 08:02) (18 - 20)  SpO2: 96% (13 Nov 2019 00:00) (96% - 98%)  GEN: NAD  HEENT: normocephalic and atraumatic. left eye injection, left upper face with erythema and swelling tender in touch especially upper lip.   NECK: Supple.  No lymphadenopathy   LUNGS: Clear to auscultation.  HEART: Regular rate and rhythm without murmur.  ABDOMEN: Soft, nontender, and nondistended.  Positive bowel sounds.    : No CVA tenderness  EXTREMITIES: Without any cyanosis, clubbing, rash, lesions or edema.  NEUROLOGIC: grossly intact.  PSYCHIATRIC: Appropriate affect .  SKIN: small areas of open vesicles in left side of upper face.     Labs:                        17.0   4.75  )-----------( 149      ( 12 Nov 2019 06:06 )             50.3     RECENT CULTURES:  11-11 @ 10:27 .Urine     No growth    All imaging and other data have been reviewed.      Assessment and Plan:   68 y/o man with no sig significant PMH, noticed swelling in left lower eyelid that started spreading on 11/6 came to [facility] ED on 11/9 and received bactrim and keflex with no changes, so decided to come in today again. Clinically especially the distribution of rash looks like zoster.     Left maxillary (trigeminal) zoster     - Blood culture negative to date.   - Viral PCR of left nasolabial fold has been sent, still pending.   - Continue valtrex 1g q8h  - If worsening of his condition will switch to IV acyclovir (10mg/kg TID)  - Vision check if any changes, will call ophthalmology   - Trend WBC and Tm, currently both normal.   - If PCR is positive for zoster can be discharged home on oral vlatrex to complete 7days total otherwise will watch him one more day.     Will follow.

## 2019-11-13 NOTE — PROGRESS NOTE ADULT - PROBLEM SELECTOR PLAN 1
WBC is normal with no fever, possible Shingles as the erythema takes only the left side of the face with sharp end of the erythema and swelling by the midline of the lower lips, tiny blisters also started to appear although no palpable or tender lymph nodes. there is a new black spot/skin gangrene started to show under the left eye ?? spider bite??.  patient was started on IV Clindamycin 600 mg ever 8 hrs, Valcyclovir 1000 mg po every 8 hrs. ID consult saw the patient as them likelu left maxillary (trigeminal) zoster, Blood culture negative to date, Viral PCR of left nasolabial fold still pending. Continue valtrex 1g q8H, If worsening of his condition will switch to IV acyclovir (10mg/kg TID), Vision check if any changes, will call ophthalmology, If PCR is positive for zoster can be discharged home on oral vlatrex to complete 7days total otherwise will watch him one more day.

## 2019-11-13 NOTE — PROGRESS NOTE ADULT - SUBJECTIVE AND OBJECTIVE BOX
JANELL KOWALSKI    [id number]    69y      Male    Patient is a 69y old  Male who presents with a chief complaint of left face swelling (13 Nov 2019 11:29)      INTERVAL HPI/OVERNIGHT EVENTS:    Patient and is doing better, he feels less swelling and tenderness of his face, he denies any blurry vision or decrease vision.     REVIEW OF SYSTEMS:    CONSTITUTIONAL: No fever, no fatigue  RESPIRATORY: No cough, No shortness of breath  CARDIOVASCULAR: No chest pain, palpitations  GASTROINTESTINAL: No abdominal, No nausea, vomiting  NEUROLOGICAL: No headaches,  loss of strength.  MISCELLANEOUS: left facial swelling and redness       Vital Signs Last 24 Hrs  T(C): 37.2 (13 Nov 2019 08:02), Max: 37.2 (13 Nov 2019 08:02)  T(F): 99 (13 Nov 2019 08:02), Max: 99 (13 Nov 2019 08:02)  HR: 79 (13 Nov 2019 08:02) (75 - 98)  BP: 135/81 (13 Nov 2019 08:02) (112/65 - 152/89)  RR: 20 (13 Nov 2019 08:02) (18 - 20)  SpO2: 96% (13 Nov 2019 00:00) (96% - 98%)    PHYSICAL EXAM:    GENERAL: Elderly male looking comfortable   HEENT: left facial swelling and redness, some conjunctival injection on the left side as well, no double vision   NECK: soft, Supple, No JVD,   CHEST/LUNG: Clear to auscultate bilaterally; No wheezing  HEART: S1S2+, Regular rate and rhythm; No murmurs  ABDOMEN: Soft, Nontender, Nondistended; Bowel sounds present  EXTREMITIES:  1+ Peripheral Pulses, No edema  SKIN: No rashes or lesions  NEURO: AAOX3, no focal deficits, no motor r sensory loss  PSYCH: normal mood      LABS:                        17.0   4.75  )-----------( 149      ( 12 Nov 2019 06:06 )             50.3                   I&O's Summary    12 Nov 2019 07:01  -  13 Nov 2019 07:00  --------------------------------------------------------  IN: 240 mL / OUT: 0 mL / NET: 240 mL        MEDICATIONS  (STANDING):  enoxaparin Injectable 40 milliGRAM(s) SubCutaneous daily  influenza   Vaccine 0.5 milliLiter(s) IntraMuscular once  melatonin 5 milliGRAM(s) Oral at bedtime  valACYclovir 1000 milliGRAM(s) Oral every 8 hours    MEDICATIONS  (PRN):  acetaminophen   Tablet .. 975 milliGRAM(s) Oral every 6 hours PRN Moderate Pain (4 - 6)  diphenhydrAMINE 25 milliGRAM(s) Oral every 4 hours PRN Rash and/or Itching  ketorolac   Injectable 15 milliGRAM(s) IV Push every 8 hours PRN Moderate Pain (4 - 6)

## 2019-11-14 ENCOUNTER — TRANSCRIPTION ENCOUNTER (OUTPATIENT)
Age: 69
End: 2019-11-14

## 2019-11-14 VITALS
TEMPERATURE: 99 F | RESPIRATION RATE: 19 BRPM | DIASTOLIC BLOOD PRESSURE: 81 MMHG | OXYGEN SATURATION: 95 % | SYSTOLIC BLOOD PRESSURE: 118 MMHG | HEART RATE: 106 BPM

## 2019-11-14 PROCEDURE — 87086 URINE CULTURE/COLONY COUNT: CPT

## 2019-11-14 PROCEDURE — 87798 DETECT AGENT NOS DNA AMP: CPT

## 2019-11-14 PROCEDURE — 96366 THER/PROPH/DIAG IV INF ADDON: CPT

## 2019-11-14 PROCEDURE — 99232 SBSQ HOSP IP/OBS MODERATE 35: CPT

## 2019-11-14 PROCEDURE — 70487 CT MAXILLOFACIAL W/DYE: CPT

## 2019-11-14 PROCEDURE — 85027 COMPLETE CBC AUTOMATED: CPT

## 2019-11-14 PROCEDURE — 96375 TX/PRO/DX INJ NEW DRUG ADDON: CPT | Mod: XU

## 2019-11-14 PROCEDURE — 81001 URINALYSIS AUTO W/SCOPE: CPT

## 2019-11-14 PROCEDURE — 36415 COLL VENOUS BLD VENIPUNCTURE: CPT

## 2019-11-14 PROCEDURE — 86803 HEPATITIS C AB TEST: CPT

## 2019-11-14 PROCEDURE — 96367 TX/PROPH/DG ADDL SEQ IV INF: CPT | Mod: XU

## 2019-11-14 PROCEDURE — 80053 COMPREHEN METABOLIC PANEL: CPT

## 2019-11-14 PROCEDURE — 99285 EMERGENCY DEPT VISIT HI MDM: CPT | Mod: 25

## 2019-11-14 PROCEDURE — 83036 HEMOGLOBIN GLYCOSYLATED A1C: CPT

## 2019-11-14 PROCEDURE — 83605 ASSAY OF LACTIC ACID: CPT

## 2019-11-14 PROCEDURE — 85610 PROTHROMBIN TIME: CPT

## 2019-11-14 PROCEDURE — G0378: CPT

## 2019-11-14 PROCEDURE — 96365 THER/PROPH/DIAG IV INF INIT: CPT | Mod: XU

## 2019-11-14 PROCEDURE — 85730 THROMBOPLASTIN TIME PARTIAL: CPT

## 2019-11-14 PROCEDURE — 87040 BLOOD CULTURE FOR BACTERIA: CPT

## 2019-11-14 PROCEDURE — 85652 RBC SED RATE AUTOMATED: CPT

## 2019-11-14 PROCEDURE — 87529 HSV DNA AMP PROBE: CPT

## 2019-11-14 PROCEDURE — 99239 HOSP IP/OBS DSCHRG MGMT >30: CPT

## 2019-11-14 RX ORDER — VALACYCLOVIR 500 MG/1
1 TABLET, FILM COATED ORAL
Qty: 15 | Refills: 0
Start: 2019-11-14 | End: 2019-11-18

## 2019-11-14 RX ORDER — DIPHENHYDRAMINE HCL 50 MG
1 CAPSULE ORAL
Qty: 0 | Refills: 0 | DISCHARGE
Start: 2019-11-14

## 2019-11-14 RX ORDER — LANOLIN ALCOHOL/MO/W.PET/CERES
1 CREAM (GRAM) TOPICAL
Qty: 0 | Refills: 0 | DISCHARGE
Start: 2019-11-14

## 2019-11-14 RX ORDER — ACETAMINOPHEN 500 MG
1 TABLET ORAL
Qty: 0 | Refills: 0 | DISCHARGE

## 2019-11-14 RX ADMIN — ENOXAPARIN SODIUM 40 MILLIGRAM(S): 100 INJECTION SUBCUTANEOUS at 12:08

## 2019-11-14 RX ADMIN — VALACYCLOVIR 1000 MILLIGRAM(S): 500 TABLET, FILM COATED ORAL at 05:22

## 2019-11-14 RX ADMIN — VALACYCLOVIR 1000 MILLIGRAM(S): 500 TABLET, FILM COATED ORAL at 12:08

## 2019-11-14 NOTE — DISCHARGE NOTE PROVIDER - CARE PROVIDERS DIRECT ADDRESSES
[email or web address],[email or web address],DirectAddress_Unknown

## 2019-11-14 NOTE — PROGRESS NOTE ADULT - SUBJECTIVE AND OBJECTIVE BOX
[facility]  INFECTIOUS DISEASES AND INTERNAL MEDICINE at [place]  =======================================================  Zain Reina MD  Diplomates American Board of Internal Medicine and Infectious Diseases  =======================================================    [id number]  JANELL KOWALSKI     Follow up: L Maxillary branch zoster     Less pain, no fever. Lesions are getting dry. No vesicles.     PAST MEDICAL & SURGICAL HISTORY:  No pertinent past medical history  H/O rotator cuff surgery    Social Hx: smoker, no ETOH or drugs     FAMILY HISTORY:  No pertinent family history in first degree relatives    Allergies  No Known Allergies    Antibiotics:  valACYclovir 1000 milliGRAM(s) Oral every 8 hours    REVIEW OF SYSTEMS:  CONSTITUTIONAL:  No Fever or chills  HEENT:   No sore throat or runny nose. pain and swelling in left face  CARDIOVASCULAR:  No chest pain or SOB.  RESPIRATORY:  No cough, shortness of breath, PND or orthopnea.  GASTROINTESTINAL:  No nausea, vomiting or diarrhea.  GENITOURINARY:  No dysuria, frequency or urgency. No Blood in urine  MUSCULOSKELETAL:  no joint aches, no muscle pain  SKIN:  left face swelling and pain  NEUROLOGIC:  No paresthesias, fasciculations, seizures or weakness.  PSYCHIATRIC:  No disorder of thought or mood.  ENDOCRINE:  No heat or cold intolerance, polyuria or polydipsia.  HEMATOLOGICAL:  No easy bruising or bleeding.     Physical Exam:  Vital Signs Last 24 Hrs  T(C): 37 (14 Nov 2019 09:07), Max: 37.2 (14 Nov 2019 00:00)  T(F): 98.6 (14 Nov 2019 09:07), Max: 99 (14 Nov 2019 00:00)  HR: 106 (14 Nov 2019 09:07) (56 - 106)  BP: 118/81 (14 Nov 2019 09:07) (118/81 - 144/87)  RR: 19 (14 Nov 2019 09:07) (19 - 20)  SpO2: 95% (14 Nov 2019 09:07) (92% - 95%)  GEN: NAD  HEENT: normocephalic and atraumatic. left eye injection, left upper face with erythema and swelling.   NECK: Supple.  No lymphadenopathy   LUNGS: Clear to auscultation.  HEART: Regular rate and rhythm without murmur.  ABDOMEN: Soft, nontender, and nondistended.  Positive bowel sounds.    : No CVA tenderness  EXTREMITIES: Without any cyanosis, clubbing, rash, lesions or edema.  NEUROLOGIC: grossly intact.  PSYCHIATRIC: Appropriate affect .  SKIN: small areas of open vesicles in left side of upper face.     Labs:  RECENT CULTURES:  11-11 @ 11:06 .Blood     No growth at 48 hours    11-11 @ 11:04 .Blood     No growth at 48 hours    11-11 @ 10:27 .Urine     No growth    All imaging and other data have been reviewed.    Assessment and Plan:   70 y/o man with no sig significant PMH, noticed swelling in left lower eyelid that started spreading on 11/6 came to [facility] ED on 11/9 and received bactrim and keflex with no changes, so decided to come in today again. Clinically especially the distribution of rash looks like zoster.     Left maxillary (trigeminal) zoster     - Blood culture negative to date.   - Viral PCR of left nasolabial fold showed VZV + in PCR   - Continue valtrex 1g q8h, complete 7days total.   - Ophthalmology exam recommended to him after discharge  - Zoster vaccine as Outpt after discharge.   - One week appt with me after discharge.     Will sign off, please call with any question.

## 2019-11-14 NOTE — DISCHARGE NOTE PROVIDER - NSDCMRMEDTOKEN_GEN_ALL_CORE_FT
diphenhydrAMINE 25 mg oral capsule: 1 cap(s) orally every 4 hours, As needed, Rash and/or Itching  melatonin 5 mg oral tablet: 1 tab(s) orally once a day (at bedtime)  Tylenol 500 mg oral tablet: 1 tab(s) orally every 6 hours, As Needed  valACYclovir 1 g oral tablet: 1 tab(s) orally every 8 hours

## 2019-11-14 NOTE — DISCHARGE NOTE PROVIDER - NSDCFUADDINST_GEN_ALL_CORE_FT
please see eye doctor Dr Howard tomorrow at 8:30 am, your appointment has been scheduled already.   Call for appointment for infectious disease doctor.

## 2019-11-14 NOTE — DISCHARGE NOTE PROVIDER - PROVIDER TOKENS
PROVIDER:[TOKEN:[[id number]]],PROVIDER:[TOKEN:[[id number]]],FREE:[LAST:[PMD],PHONE:[(   )    -],FAX:[(   )    -],ADDRESS:[in 1 week, please call the office and get an appiontment]]

## 2019-11-14 NOTE — DISCHARGE NOTE PROVIDER - HOSPITAL COURSE
68 y/o male smoker with inflammation of the left side of the face with possible cellulitis/Shingles/insect bite, patient wad admitted under medicine for further management of Facial cellulitis VS Herpes Zoster infection, she was noted to have WBC is normal with no fever, possible Shingles as the erythema takes only the left side of the face with sharp end of the erythema and swelling by the midline of the lower lips, tiny blisters also started to appear although no palpable or tender lymph nodes, patient was initially started on IV Clindamycin 600 mg ever 8 hrs, Valcyclovir 1000 mg po every 8 hrs, patient was seen and followed by ID Blood culture came negative, Viral PCR of left nasolabial fold came positive for VZV + in PCR, as per ID continue valtrex 1g q8h, complete 7days total, ophthalmology exam recommended to him after discharge, Zoster vaccine as Outpt after discharge.     One week appt with ID after discharge.     Over the course her IV clindamycin was discontinued as bacterial infection like cellulitis was ruled out on the clinical ground.     his redness and swelling improved over the course, he had no blurry vision or decrease vision over the course.     Patient has Hx of smoking and was counselled at length.         Patient is doing well, his symptoms are better he is being discharged home in a stable condition.         Vital Signs Last 24 Hrs    T(C): 37 (14 Nov 2019 09:07), Max: 37.2 (14 Nov 2019 00:00)    T(F): 98.6 (14 Nov 2019 09:07), Max: 99 (14 Nov 2019 00:00)    HR: 106 (14 Nov 2019 09:07) (56 - 106)    BP: 118/81 (14 Nov 2019 09:07) (118/81 - 144/87)    RR: 19 (14 Nov 2019 09:07) (19 - 20)    SpO2: 95% (14 Nov 2019 09:07) (92% - 95%)        PHYSICAL EXAM:        GENERAL: Elderly male looking comfortable     HEENT: left facial swelling and redness is better, some conjunctival injection on the left side as well, no double vision     NECK: soft, Supple, No JVD,     CHEST/LUNG: Clear to auscultate bilaterally; No wheezing    HEART: S1S2+, Regular rate and rhythm; No murmurs    ABDOMEN: Soft, Nontender, Nondistended; Bowel sounds present    EXTREMITIES:  1+ Peripheral Pulses, No edema    SKIN: No rashes or lesions    NEURO: AAOX3, no focal deficits, no motor r sensory loss    PSYCH: normal mood        Total time spent 38minutes.

## 2019-11-14 NOTE — DISCHARGE NOTE PROVIDER - CARE PROVIDER_API CALL
Daquan Melvin)  Infectious Disease; Internal Medicine  [address], [address]  [place] [address]  Phone: [phone]  Fax: [phone]  Follow Up Time:     Antonio Howard)  Ophthalmology  [address]  [place] [address]  Phone: [phone]  Fax: [phone]  Follow Up Time:     PMD,   in 1 week, please call the office and get an appiontment  Phone: (   )    -  Fax: (   )    -  Follow Up Time:

## 2019-11-14 NOTE — DISCHARGE NOTE NURSING/CASE MANAGEMENT/SOCIAL WORK - PATIENT PORTAL LINK FT
You can access the FollowMyHealth Patient Portal offered by [facility] by registering at the following website: [email or web address]. By joining [facility]’s FollowMyHealth portal, you will also be able to view your health information using other applications (apps) compatible with our system.

## 2019-11-14 NOTE — DISCHARGE NOTE PROVIDER - NSDCCPCAREPLAN_GEN_ALL_CORE_FT
PRINCIPAL DISCHARGE DIAGNOSIS  Diagnosis: Herpes zoster  Assessment and Plan of Treatment:       SECONDARY DISCHARGE DIAGNOSES  Diagnosis: Smoking  Assessment and Plan of Treatment:

## 2019-11-16 LAB
CULTURE RESULTS: SIGNIFICANT CHANGE UP
CULTURE RESULTS: SIGNIFICANT CHANGE UP
SPECIMEN SOURCE: SIGNIFICANT CHANGE UP
SPECIMEN SOURCE: SIGNIFICANT CHANGE UP

## 2019-11-20 ENCOUNTER — APPOINTMENT (OUTPATIENT)
Dept: INTERNAL MEDICINE | Facility: CLINIC | Age: 69
End: 2019-11-20
Payer: MEDICARE

## 2019-11-20 VITALS
SYSTOLIC BLOOD PRESSURE: 120 MMHG | BODY MASS INDEX: 24.44 KG/M2 | DIASTOLIC BLOOD PRESSURE: 70 MMHG | HEIGHT: 69 IN | WEIGHT: 165 LBS

## 2019-11-20 PROBLEM — Z78.9 OTHER SPECIFIED HEALTH STATUS: Chronic | Status: ACTIVE | Noted: 2019-11-12

## 2019-11-20 PROBLEM — Z00.00 ENCOUNTER FOR PREVENTIVE HEALTH EXAMINATION: Status: ACTIVE | Noted: 2019-11-20

## 2019-11-20 PROCEDURE — 99495 TRANSJ CARE MGMT MOD F2F 14D: CPT

## 2019-11-20 PROCEDURE — 99213 OFFICE O/P EST LOW 20 MIN: CPT | Mod: 25

## 2019-11-20 RX ORDER — CEPHALEXIN 500 MG/1
500 CAPSULE ORAL
Qty: 28 | Refills: 0 | Status: DISCONTINUED | COMMUNITY
Start: 2019-11-09

## 2019-11-20 RX ORDER — ERYTHROMYCIN 5 MG/G
5 OINTMENT OPHTHALMIC
Qty: 4 | Refills: 0 | Status: DISCONTINUED | COMMUNITY
Start: 2019-11-15

## 2019-11-20 RX ORDER — SULFAMETHOXAZOLE AND TRIMETHOPRIM 800; 160 MG/1; MG/1
800-160 TABLET ORAL
Qty: 14 | Refills: 0 | Status: DISCONTINUED | COMMUNITY
Start: 2019-11-09

## 2019-11-20 NOTE — HISTORY OF PRESENT ILLNESS
[FreeTextEntry1] : 70 y/o man with no sig significant PMH, noticed swelling in left lower eyelid\par that started spreading on 11/6 came to [facility] ED on 11/9 and received bactrim and\par keflex with no changes, so decided to come in today again. Clinically\par especially the distribution of rash looked like zoster so I sent zoster PCR which came back positive. Started on valtrex 1gm q8h for 7 days.\par Today rash is completely dry and crusted but has a pain in scalp and numbness in upper lip and left cheek  that bothers him. seen opthh with no problems with his eye and vision. \par Left maxillary (trigeminal) zoster\par

## 2019-11-20 NOTE — ASSESSMENT
[FreeTextEntry1] : Only one Valtrex is remaining from his course of treatment. Will complete it.\par Will wait until lesins are completely healed. cont NASIDs or tylenol prn for pain.\par IF pain doesn’t improve will start him on gabapentin or amitriptyline. \par Can go back to his work. \par Will see him in one month for zoster vaccine.  [Treatment Education] : treatment education [Treatment Adherence] : treatment adherence [Rx Dose / Side Effects] : Rx dose/side effects [Disclosure of Diagnosis] : disclosure of diagnosis [Risk Reduction] : risk reduction [Drug Interactions / Side Effects] : drug interactions/side effects [Anticipatory Guidance] : anticipatory guidance

## 2019-11-20 NOTE — PHYSICAL EXAM
[General Appearance - In No Acute Distress] : in no acute distress [General Appearance - Alert] : alert [Sclera] : the sclera and conjunctiva were normal [Extraocular Movements] : extraocular movements were intact [PERRL With Normal Accommodation] : pupils were equal in size, round, reactive to light [Outer Ear] : the ears and nose were normal in appearance [Neck Appearance] : the appearance of the neck was normal [Oropharynx] : the oropharynx was normal with no thrush [Jugular Venous Distention Increased] : there was no jugular-venous distention [Thyroid Diffuse Enlargement] : the thyroid was not enlarged [Neck Cervical Mass (___cm)] : no neck mass was observed [Heart Sounds] : normal S1 and S2 [Heart Rate And Rhythm] : heart rate was normal and rhythm regular [Auscultation Breath Sounds / Voice Sounds] : lungs were clear to auscultation bilaterally [Murmurs] : no murmurs [Heart Sounds Gallop] : no gallops [Heart Sounds Pericardial Friction Rub] : no pericardial rub [Bowel Sounds] : normal bowel sounds [Edema] : there was no peripheral edema [Full Pulse] : the pedal pulses are present [Abdomen Tenderness] : non-tender [Abdomen Soft] : soft [Abdomen Mass (___ Cm)] : no abdominal mass palpated [] : no hepato-splenomegaly [Costovertebral Angle Tenderness] : no CVA tenderness [No Palpable Adenopathy] : no palpable adenopathy [Nail Clubbing] : no clubbing  or cyanosis of the fingernails [Musculoskeletal - Swelling] : no joint swelling [Motor Tone] : muscle strength and tone were normal [No Focal Deficits] : no focal deficits [FreeTextEntry1] : crusted lesions in left side of face, no vesicular rash , mild erythema improved significantly  [Sensation] : the sensory exam was normal to light touch and pinprick [Deep Tendon Reflexes (DTR)] : deep tendon reflexes were 2+ and symmetric [Affect] : the affect was normal [Oriented To Time, Place, And Person] : oriented to person, place, and time

## 2019-12-18 ENCOUNTER — APPOINTMENT (OUTPATIENT)
Dept: INTERNAL MEDICINE | Facility: CLINIC | Age: 69
End: 2019-12-18
Payer: MEDICARE

## 2019-12-18 VITALS
HEIGHT: 69 IN | DIASTOLIC BLOOD PRESSURE: 70 MMHG | WEIGHT: 165 LBS | BODY MASS INDEX: 24.44 KG/M2 | SYSTOLIC BLOOD PRESSURE: 120 MMHG

## 2019-12-18 PROCEDURE — 90750 HZV VACC RECOMBINANT IM: CPT

## 2019-12-18 PROCEDURE — 90471 IMMUNIZATION ADMIN: CPT

## 2019-12-18 PROCEDURE — 99213 OFFICE O/P EST LOW 20 MIN: CPT | Mod: 25

## 2019-12-18 RX ORDER — LIDOCAINE 5% 700 MG/1
5 PATCH TOPICAL
Qty: 20 | Refills: 0 | Status: ACTIVE | COMMUNITY
Start: 2019-12-18 | End: 1900-01-01

## 2019-12-18 RX ORDER — AMITRIPTYLINE HYDROCHLORIDE 25 MG/1
25 TABLET, FILM COATED ORAL AT BEDTIME
Qty: 30 | Refills: 0 | Status: ACTIVE | COMMUNITY
Start: 2019-12-18 | End: 1900-01-01

## 2019-12-18 RX ORDER — VALACYCLOVIR 1 G/1
1 TABLET, FILM COATED ORAL
Qty: 15 | Refills: 0 | Status: DISCONTINUED | COMMUNITY
Start: 2019-11-14 | End: 2019-12-18

## 2019-12-18 NOTE — PHYSICAL EXAM
[General Appearance - Alert] : alert [General Appearance - In No Acute Distress] : in no acute distress [Sclera] : the sclera and conjunctiva were normal [PERRL With Normal Accommodation] : pupils were equal in size, round, reactive to light [Outer Ear] : the ears and nose were normal in appearance [Oropharynx] : the oropharynx was normal with no thrush [Extraocular Movements] : extraocular movements were intact [Neck Appearance] : the appearance of the neck was normal [Neck Cervical Mass (___cm)] : no neck mass was observed [Thyroid Diffuse Enlargement] : the thyroid was not enlarged [Jugular Venous Distention Increased] : there was no jugular-venous distention [Heart Sounds] : normal S1 and S2 [Auscultation Breath Sounds / Voice Sounds] : lungs were clear to auscultation bilaterally [Heart Sounds Gallop] : no gallops [Heart Rate And Rhythm] : heart rate was normal and rhythm regular [Heart Sounds Pericardial Friction Rub] : no pericardial rub [Murmurs] : no murmurs [Abdomen Soft] : soft [Edema] : there was no peripheral edema [Full Pulse] : the pedal pulses are present [Bowel Sounds] : normal bowel sounds [] : no hepato-splenomegaly [Abdomen Tenderness] : non-tender [Abdomen Mass (___ Cm)] : no abdominal mass palpated [Costovertebral Angle Tenderness] : no CVA tenderness [Nail Clubbing] : no clubbing  or cyanosis of the fingernails [No Palpable Adenopathy] : no palpable adenopathy [Musculoskeletal - Swelling] : no joint swelling [Motor Tone] : muscle strength and tone were normal [Deep Tendon Reflexes (DTR)] : deep tendon reflexes were 2+ and symmetric [Sensation] : the sensory exam was normal to light touch and pinprick [FreeTextEntry1] : hyperesthesia on left maxillary area [No Focal Deficits] : no focal deficits [Affect] : the affect was normal [Oriented To Time, Place, And Person] : oriented to person, place, and time

## 2019-12-18 NOTE — REVIEW OF SYSTEMS
[Negative] : Psychiatric [de-identified] : pain in left maxillary distribution or zoster rash, no sign of rash or infection.

## 2019-12-18 NOTE — HISTORY OF PRESENT ILLNESS
[FreeTextEntry1] : 70 y/o man with no sig significant PMH, noticed swelling in left lower eyelid\par that started spreading on 11/6 came to [facility] ED on 11/9 and received bactrim and\par keflex with no changes,Clinically especially the distribution of rash looked like zoster so I sent zoster PCR which came back positive. Started on valtrex 1gm q8h for 7 days.\par Today rash is completely resolved but he is complaining of pain on the site with hypersthenia and pain.   seen opthh with no problems with his eye and vision. \par Left maxillary (trigeminal) zoster\par

## 2019-12-18 NOTE — ASSESSMENT
[FreeTextEntry1] : Will give him the first dose of shigerix vaccine. in 2 months 2nd dose. \par Will start him on Lidocaine patch (12hr on and 12hr off) and amitriptyline 25mg bedtime for PNH. \par Will see him in one month, if still has pain will try Lyrica and other meds. \par No driving while taking amitriptyline.  [Treatment Education] : treatment education [Treatment Adherence] : treatment adherence [Rx Dose / Side Effects] : Rx dose/side effects [Risk Reduction] : risk reduction [Disclosure of Diagnosis] : disclosure of diagnosis [Drug Interactions / Side Effects] : drug interactions/side effects [Anticipatory Guidance] : anticipatory guidance

## 2020-01-15 ENCOUNTER — APPOINTMENT (OUTPATIENT)
Dept: INTERNAL MEDICINE | Facility: CLINIC | Age: 70
End: 2020-01-15
Payer: MEDICARE

## 2020-01-15 VITALS
WEIGHT: 165 LBS | BODY MASS INDEX: 24.44 KG/M2 | SYSTOLIC BLOOD PRESSURE: 110 MMHG | DIASTOLIC BLOOD PRESSURE: 65 MMHG | HEIGHT: 69 IN

## 2020-01-15 PROCEDURE — 99213 OFFICE O/P EST LOW 20 MIN: CPT

## 2020-01-15 NOTE — HISTORY OF PRESENT ILLNESS
[FreeTextEntry1] : 68 y/o man with no sig significant PMH, noticed swelling in left lower eyelid\par that started spreading on 11/6 came to [facility] ED on 11/9 and received bactrim and\par keflex with no changes,Clinically especially the distribution of rash looked like zoster so I sent zoster PCR which came back positive. Started on valtrex 1gm q8h for 7 days.\par He was seen last month with improvement of rash and scars. Had PHN for which amitriptyline and lidocaine patches were given. pain slowly getting better and he feels that patch and pills are not very helpful. \par He had the first shot of his zoster vaccine (shingrix) last month. \par No problem with vision, rash completely gone no scars. Has mild pain in the area. \par

## 2020-01-15 NOTE — PHYSICAL EXAM
[General Appearance - Alert] : alert [General Appearance - In No Acute Distress] : in no acute distress [PERRL With Normal Accommodation] : pupils were equal in size, round, reactive to light [Sclera] : the sclera and conjunctiva were normal [Outer Ear] : the ears and nose were normal in appearance [Extraocular Movements] : extraocular movements were intact [Oropharynx] : the oropharynx was normal with no thrush [Neck Cervical Mass (___cm)] : no neck mass was observed [Neck Appearance] : the appearance of the neck was normal [Jugular Venous Distention Increased] : there was no jugular-venous distention [Thyroid Diffuse Enlargement] : the thyroid was not enlarged [Auscultation Breath Sounds / Voice Sounds] : lungs were clear to auscultation bilaterally [Heart Rate And Rhythm] : heart rate was normal and rhythm regular [Heart Sounds] : normal S1 and S2 [Heart Sounds Gallop] : no gallops [Murmurs] : no murmurs [Heart Sounds Pericardial Friction Rub] : no pericardial rub [Abdomen Soft] : soft [Full Pulse] : the pedal pulses are present [Edema] : there was no peripheral edema [Bowel Sounds] : normal bowel sounds [Abdomen Tenderness] : non-tender [] : no hepato-splenomegaly [Costovertebral Angle Tenderness] : no CVA tenderness [Abdomen Mass (___ Cm)] : no abdominal mass palpated [No Palpable Adenopathy] : no palpable adenopathy [Nail Clubbing] : no clubbing  or cyanosis of the fingernails [Musculoskeletal - Swelling] : no joint swelling [Motor Tone] : muscle strength and tone were normal [Deep Tendon Reflexes (DTR)] : deep tendon reflexes were 2+ and symmetric [No Focal Deficits] : no focal deficits [Sensation] : the sensory exam was normal to light touch and pinprick [FreeTextEntry1] : hyperesthesia on left maxillary area [Oriented To Time, Place, And Person] : oriented to person, place, and time [Affect] : the affect was normal

## 2020-01-15 NOTE — ASSESSMENT
[FreeTextEntry1] : - Will stop amitriptyline that is not working, if pain gets worse it means, it was helpful so will restart with slightly higher dose. \par - Next month will get 2nd dose of shingrix. \par - The PHN pain usually decreases by time. If not can add gabapentin as well. But as per him it is tolerable without any meds. \par  [Risk Reduction] : risk reduction [Treatment Education] : treatment education [Anticipatory Guidance] : anticipatory guidance

## 2020-02-19 ENCOUNTER — APPOINTMENT (OUTPATIENT)
Dept: INTERNAL MEDICINE | Facility: CLINIC | Age: 70
End: 2020-02-19
Payer: MEDICARE

## 2020-02-19 VITALS
DIASTOLIC BLOOD PRESSURE: 60 MMHG | WEIGHT: 165 LBS | SYSTOLIC BLOOD PRESSURE: 120 MMHG | HEIGHT: 69 IN | BODY MASS INDEX: 24.44 KG/M2

## 2020-02-19 DIAGNOSIS — B02.9 ZOSTER W/OUT COMPLICATIONS: ICD-10-CM

## 2020-02-19 DIAGNOSIS — B02.23 POSTHERPETIC POLYNEUROPATHY: ICD-10-CM

## 2020-02-19 PROCEDURE — 90471 IMMUNIZATION ADMIN: CPT

## 2020-02-19 PROCEDURE — 99214 OFFICE O/P EST MOD 30 MIN: CPT | Mod: 25

## 2020-02-19 PROCEDURE — 90750 HZV VACC RECOMBINANT IM: CPT

## 2020-02-19 NOTE — HISTORY OF PRESENT ILLNESS
[FreeTextEntry1] : 68 y/o man with left maxillary zoster about 3 months ago in 11/2019, is here for follow up. \par  Started on valtrex 1gm q8h for 7 days with good response but still has PHN in the area, mostly feels sensitivity and discomfort on the area. \par For PHN for which amitriptyline and lidocaine patches were given. pain slowly getting better and he feels that patch and pills are not very helpful. \par No problem with vision, rash completely gone no scars. \par Had first shot of shingrix, here for 2nd shot. \par

## 2020-02-19 NOTE — ASSESSMENT
[FreeTextEntry1] : I offered him Lyrica but he wants to think about it.\par We'll give him his second shot of zoster vaccine today.\par He will call me if there is any problem or if he is interested in trying Lyrica. [Risk Reduction] : risk reduction [Anticipatory Guidance] : anticipatory guidance

## 2020-02-19 NOTE — PHYSICAL EXAM
[General Appearance - Alert] : alert [General Appearance - In No Acute Distress] : in no acute distress [Sclera] : the sclera and conjunctiva were normal [PERRL With Normal Accommodation] : pupils were equal in size, round, reactive to light [Extraocular Movements] : extraocular movements were intact [Outer Ear] : the ears and nose were normal in appearance [Oropharynx] : the oropharynx was normal with no thrush [Neck Appearance] : the appearance of the neck was normal [Jugular Venous Distention Increased] : there was no jugular-venous distention [Neck Cervical Mass (___cm)] : no neck mass was observed [Auscultation Breath Sounds / Voice Sounds] : lungs were clear to auscultation bilaterally [Thyroid Diffuse Enlargement] : the thyroid was not enlarged [Heart Rate And Rhythm] : heart rate was normal and rhythm regular [Heart Sounds Gallop] : no gallops [Heart Sounds] : normal S1 and S2 [Murmurs] : no murmurs [Heart Sounds Pericardial Friction Rub] : no pericardial rub [Full Pulse] : the pedal pulses are present [Edema] : there was no peripheral edema [Bowel Sounds] : normal bowel sounds [Abdomen Soft] : soft [Abdomen Tenderness] : non-tender [] : no hepato-splenomegaly [Abdomen Mass (___ Cm)] : no abdominal mass palpated [Costovertebral Angle Tenderness] : no CVA tenderness [No Palpable Adenopathy] : no palpable adenopathy [Nail Clubbing] : no clubbing  or cyanosis of the fingernails [Musculoskeletal - Swelling] : no joint swelling [Motor Tone] : muscle strength and tone were normal [Deep Tendon Reflexes (DTR)] : deep tendon reflexes were 2+ and symmetric [No Focal Deficits] : no focal deficits [Sensation] : the sensory exam was normal to light touch and pinprick [FreeTextEntry1] : hyperesthesia on left maxillary area [Oriented To Time, Place, And Person] : oriented to person, place, and time [Affect] : the affect was normal

## 2022-08-12 NOTE — H&P ADULT - ITE SK HX ROS MEA POS PC
Please let patient know I sent an iron supplement to her pharmacy. I would recommend taking this at night. If she still cannot tolerate this we may want to consider iron infusion therapy.    Laure Terry PA-C     see HPI

## 2024-04-17 NOTE — ED CDU PROVIDER INITIAL DAY NOTE - MEDICAL DECISION MAKING DETAILS
Refer to the Assessment tab to view/cancel completed assessment.
left facial cellulitis   iv clinda Q8

## 2024-10-08 NOTE — PATIENT PROFILE ADULT - NSPROIMPLANTSMEDDEV_GEN_A_NUR
CARDIOLOGY OUTPATIENT VISIT NOTE      PRIMARY CARE PROVIDER  Efren Negrete MD    SUPERVISING CARDIOLOGIST  Dr. Alexis    REASON FOR VISIT: Office Visit (6 month follow up, echo and stress test results. Patient states some edema, denies any other cardiac symptoms. ) and Edema         HISTORY OF PRESENTING ILLNESS:  Hussein Jenkins is a 76 year old male, presenting to the office today for routine follow up of paroxysmal atrial fibrillation.    Mr. Jenkins's atrial fibrillation was discovered in 2000.  Treatment strategy is rhythm control on flecainide, he is anticoagulated on Eliquis.  He denies bleeding concerns.    Mr Jenkins reports he fell a few days ago, this occurred as he was carrying a bunch of items in both arms, as he was descending the stairs in his home, he was two steps away from the leveled floor, had sudden dizziness and loss of balance.  He fell on his back while keeping his head flexed, to ensure he did not hit his head.  His back is sore, but denies injury.  He denied chest pain, palpitation, confusion, loss of consciousness, and nausea. Blood pressure post fall was 160/58, after an hour or so he rechecked it and it was 128/52.  He has a history of eustachian tube dysfunction and sensorineural hearing loss.  He states that he this is happened once or twice before, and feels it may be his vertigo.  He has chronic edema, he wears compression stockings daily, denies pain to his lower extremities.  He takes his diuretic regularly.      He remains active works out five days a week on the treadmill for an hour.  I reviewed the results of his latest exercise stress test and ECHO.      CURRENT MEDICAL PROBLEMS:    Patient Active Problem List   Diagnosis    FAREED (obstructive sleep apnea)    Obesity    Status post tonsillectomy and adenoidectomy    Status post right rotator cuff repair    Hypothyroidism    GERD (gastroesophageal reflux disease)    HTN (hypertension)    Rhinitis, allergic    Psoriasis     Diverticulosis    History of tobacco use    Status post bilateral cataract extraction    Atrial fibrillation  (CMD)        MEDICATIONS:    Current Outpatient Medications   Medication Sig    gabapentin (NEURONTIN) 300 MG capsule Take 300 mg by mouth nightly.    tadalafil (CIALIS) 5 MG tablet Take 5 mg by mouth daily.    flecainide (TAMBOCOR) 50 MG tablet Take 1 tablet by mouth in the morning and 1 tablet in the evening.    carvedilol (COREG) 12.5 MG tablet Take 1 tablet by mouth in the morning and 1 tablet in the evening. Take with meals.    furosemide (LASIX) 40 MG tablet Take 1 tablet by mouth daily.    apixaBAN (Eliquis) 5 MG Tab Take 1 tablet by mouth every 12 hours.    levothyroxine 125 MCG tablet Take 1 tablet by mouth daily.    zinc gluconate 50 MG tablet     Magnesium 500 MG Cap Take 1 capsule by mouth daily.    ferrous sulfate 325 (65 FE) MG EC tablet Take 325 mg by mouth daily.    Calcium Carbonate 1500 (600 Ca) MG Tab Take 2 tablets by mouth every 24 hours.    diphenhydrAMINE (BENADRYL) 25 MG tablet Take 25 mg by mouth daily as needed for Itching.    solifenacin (VESICARE) 5 MG tablet Take 5 mg by mouth daily.    turmeric 500 MG capsule Take 1 capsule by mouth daily.    Calcium Carbonate-Vit D-Min (CALCIUM 1200 PO)     Omega-3 Fatty Acids (FISH OIL PO)     Coenzyme Q10 (CO Q 10 PO)     Multiple Vitamin (DAILY VITAMIN) Tab Take 1 tablet by mouth daily.    famotidine (PEPCID) 20 MG tablet Take 20 mg by mouth daily.      No current facility-administered medications for this visit.     Facility-Administered Medications Ordered in Other Visits   Medication    sodium chloride 0.9 % injection 10 mL         PAST MEDICAL, SURGICAL, FAMILY, AND SOCIAL HISTORY  Personally reviewed and updated:   Allergies, Problem list, Past Medical History, Past Surgical History, Social History and Family History      REVIEW OF SYSTEMS:    Review of Systems   Constitutional: Negative for chills, diaphoresis, fever and  malaise/fatigue.   HENT:  Negative for congestion.    Cardiovascular:  Positive for leg swelling. Negative for chest pain, claudication, dyspnea on exertion, irregular heartbeat, near-syncope, orthopnea, palpitations, paroxysmal nocturnal dyspnea and syncope.   Respiratory:  Negative for shortness of breath.    Endocrine: Negative for cold intolerance and heat intolerance.   Hematologic/Lymphatic: Negative for bleeding problem. Does not bruise/bleed easily.   Musculoskeletal:  Positive for falls.   Gastrointestinal:  Negative for bloating, abdominal pain, constipation, diarrhea, nausea and vomiting.   Neurological:  Positive for dizziness. Negative for focal weakness, headaches and light-headedness.      12 point review of systems was negative other than what has already been discussed.     PHYSICAL EXAMINATION:  Visit Vitals  BP (!) 161/74 (BP Location: LUE - Left upper extremity, Patient Position: Sitting, Cuff Size: Large Adult)   Pulse (!) 50   Ht 5' 11\" (1.803 m)   Wt 106.6 kg (235 lb)   BMI 32.78 kg/m²     Weight    10/08/24 1410   Weight: 106.6 kg (235 lb)     Physical Exam   Constitutional: He appears healthy. No distress.   Eyes: Pupils are equal, round, and reactive to light. Conjunctivae are normal.   EOM intact. Anicteric sclerae.    Neck: Thyroid normal. No JVD present.   Cardiovascular: Normal rate, regular rhythm, S1 normal and S2 normal. No extrasystoles are present. PMI is not displaced. Exam reveals no gallop, no S3 and no S4.   No murmur heard.  Pulses:       Radial pulses are 2+ on the right side and 2+ on the left side.        Dorsalis pedis pulses are 2+ on the right side and 2+ on the left side.        Posterior tibial pulses are 2+ on the right side and 2+ on the left side.   Pulmonary/Chest: Effort normal and breath sounds normal. He has no wheezes. He has no rales. He exhibits no tenderness.   Abdominal: Soft. Bowel sounds are normal.   Musculoskeletal:         General: Edema present. No  tenderness or deformity. Normal range of motion.      Cervical back: Normal range of motion and neck supple.   Neurological: He is alert and oriented to person, place, and time. He has normal motor skills and intact cranial nerves (2-12). Gait normal.   Skin: Skin is warm. No rash noted. No jaundice or pallor.      Electrocardiogram:  11/16/2023  Sinus bradycardia  MI interval 186 ms  QRS duration 103 ms  QT/QTc 444/432 ms  P-R-T axes 34 to 42    Stress test:   5/15/2024  EXERCISE STRESS TEST  TEST INDICATION:  Atrial fibrillation, chest pain.  PROCEDURE:      The patient underwent treadmill exercise utilizing the Faisal protocol.  Exercise duration:  8 minutes  RPE:  10, test terminated due to exercise-induced dyspnea.  No chest pain noted.  PEAK HEART RATE:   122 beats per minute 85%MPHR   PRP:  17,108   METS:  10.3     ECG ANALYSIS:   Resting:  Normal sinus rhythm, normal ECG  Stress:   Negative standard ST-segment response to exercise  2.   Blood pressure response:  Normal  3.   Estimated functional capacity of 10.3 METS predicts low risk of all cause mortality  4.   [facility] treadmill stress test score of 8 predicts a cardiac mortality of less than 1% over the next year   5.   Chronotropic response index: normal   6.   Heart rate recovery: normal   7.   Ventricular ectopy: isolated PVCs noted in the recovery phase        MYOCARDIAL PERFUSION SPECT IMAGING  Tc-99m Sestamibi dose:  Stress 33.3 mCi, Rest 11.3 mCi  SPECT Perfusion images (stress upright,  rest upright)  2.   RAW DATA ANALYSIS: Raw images revealed no motion or attenuation  artifacts.  Right ventricle appears normal.  No abnormal extracardiac uptake of radionuclide tracer.  3.   MPI:  There is noted a small in size, mild in intensity, fixed perfusion defect involving the apical to mid 1/3 of the inferior wall in the expected distribution of the right coronary artery.  4.   Gated SPECT LV FUNCTION:  The left ventricle appeared normal in size and shape.   Normal myocardial thickening with no evidence of regional wall motion abnormalities.  Post-stress LVEF 56% (end systolic volume 54 cc; end-diastolic volume 124 cc)     IMPRESSION:     1. Normal treadmill stress test.  The patient manifested no chest discomfort or abnormal ECG changes with treadmill exercise.  2. Probably normal myocardial perfusion imaging examination.  The small fixed perfusion defect is associated with normal myocardial thickening and no regional wall motion abnormalities.    3. Normal gated SPECT examination.  Post-stress LVEF 56%.      Echocardiogram:  5/9/2024  Final Impressions    * Normal left ventricular chamber size.    * Low normal left ventricular size and systolic function, EF 50 %, GLS -16.5  %.    * (3D) Left ventricular systolic function; 51 %.    * Grade I diastolic dysfunction of the left ventricle (impaired relaxation  pattern).    * Normal right ventricular systolic function.    * Aortic valve sclerosis.    * Mild mitral valve regurgitation.    * Mild tricuspid valve regurgitation.      IMPRESSION AND PLAN:    Paroxysmal atrial fibrillation: Treatment strategy rhythm control on flecainide, he is tolerating well.  IN and QRS intervals within normal ranges, normal liver function.  Normal treadmill stress test and MPI examination.  Low normal LV function, EF 50%.  Normal left atrial size.  EHRA I.  No decline to functional capacity. [id number] score: 3.  Anticoagulated on Eliquis, dose is appropriate based on weight, age, creatinine.  CrCl 58.  No bleeding complications.  Long-term anticoagulation.  Dizziness associated with fall while descending stairs. No structural heart disease.  Unlikely A-fib RVR given no report of palpitation/tachycardia.  Clinically not consistent with neurally mediated, cardiac syncope and orthostatic hypotension.  Likely related to history of eustachian tube dysfunction and sensorineural hearing loss.  Patient was instructed to monitor symptoms closely,  and notify our clinic if increase in frequency.  Blood pressure was 161/74 during the visit, he was instructed to check BP for one week and send recordings to me for review.     Return in about 1 year (around 10/8/2025).      JEVON Louis-BC  Cardiovascular Services  [facility] [place]  [email or web address]  10/8/2024,2:57 PM.              None